# Patient Record
Sex: MALE | Race: WHITE
[De-identification: names, ages, dates, MRNs, and addresses within clinical notes are randomized per-mention and may not be internally consistent; named-entity substitution may affect disease eponyms.]

---

## 2020-09-29 ENCOUNTER — HOSPITAL ENCOUNTER (OUTPATIENT)
Dept: HOSPITAL 47 - RADXRYALE | Age: 35
Discharge: HOME | End: 2020-09-29
Attending: PHYSICIAN ASSISTANT
Payer: COMMERCIAL

## 2020-09-29 DIAGNOSIS — S97.82XA: ICD-10-CM

## 2020-09-29 DIAGNOSIS — M79.672: ICD-10-CM

## 2020-09-29 DIAGNOSIS — M47.814: Primary | ICD-10-CM

## 2020-09-29 PROCEDURE — 72072 X-RAY EXAM THORAC SPINE 3VWS: CPT

## 2020-09-30 NOTE — XR
EXAMINATION TYPE: XR thoracic spine complete

 

DATE OF EXAM: 9/29/2020

 

COMPARISON: NONE

 

HISTORY: Pain

 

TECHNIQUE: 3 views submitted

 

FINDINGS:

Alignment is anatomic.  There is no compression deformities. Hypertrophic and degenerative changes of
 the spine.  

 

IMPRESSION:

1. Multilevel hypertrophic and degenerative changes spine. Correlate with MRI as clinically warranted

## 2020-09-30 NOTE — XR
EXAMINATION TYPE: XR foot complete LT

 

DATE OF EXAM: 9/29/2020

 

COMPARISON: NONE

 

HISTORY: Pain

 

TECHNIQUE: Three views are submitted.

 

FINDINGS:

No erosive changes.. Hypertrophic arthropathy of the first MTP. Lucency overlying the distal phalanx 
the first digit.

 

IMPRESSION:

1. Findings suspicious for hairline fracture distal phalanx first digit correlate with point tenderne
ss..

## 2021-11-04 ENCOUNTER — HOSPITAL ENCOUNTER (OUTPATIENT)
Dept: HOSPITAL 47 - RADXRYALE | Age: 36
Discharge: HOME | End: 2021-11-04
Attending: FAMILY MEDICINE
Payer: COMMERCIAL

## 2021-11-04 DIAGNOSIS — M46.96: ICD-10-CM

## 2021-11-04 DIAGNOSIS — M43.17: Primary | ICD-10-CM

## 2021-11-04 PROCEDURE — 73502 X-RAY EXAM HIP UNI 2-3 VIEWS: CPT

## 2021-11-04 PROCEDURE — 72110 X-RAY EXAM L-2 SPINE 4/>VWS: CPT

## 2021-11-04 NOTE — XR
EXAMINATION TYPE: XR lumbosacral spine 5 views

 

DATE OF EXAM: 11/4/2021

 

Comparison: None

 

Clinical History: 35-year-old male  RT SCIATICA

 

Findings:

Facet arthropathy lower lumbar spine. There are bilateral L5 pars defects with grade 1, nearly grade 
2 anterolisthesis at L5-S1. Disc interspaces are maintained and vertebral body heights are preserved.


 

 

Impression:

L5 pars defects with grade 1, nearly grade 2 anterolisthesis at L5-S1. Some facet arthropathy in the 
lower lumbar spine.

## 2021-11-04 NOTE — XR
EXAMINATION TYPE: XR Hip Complete 2 views RT

 

DATE OF EXAM: 11/4/2021

 

Comparison: None

 

Clinical History: 35-year-old male  RT SCIATICA

 

Findings:

The right hip appears intact. No acute fracture, subluxation, or dislocation.

 

 

Impression:

No acute osseous abnormality seen.

## 2021-11-22 ENCOUNTER — HOSPITAL ENCOUNTER (OUTPATIENT)
Dept: HOSPITAL 47 - RADXRMAIN | Age: 36
Discharge: HOME | End: 2021-11-22
Attending: PHYSICAL MEDICINE & REHABILITATION
Payer: COMMERCIAL

## 2021-11-22 DIAGNOSIS — Z18.10: Primary | ICD-10-CM

## 2021-11-22 PROCEDURE — 70030 X-RAY EYE FOR FOREIGN BODY: CPT

## 2021-11-22 NOTE — XR
EXAMINATION TYPE: XR orbit detect foreign body

 

DATE OF EXAM: 11/22/2021

 

COMPARISON: NONE

 

HISTORY: Possible foreign body

 

TECHNIQUE: 3 views

 

FINDINGS: Orbital margins are intact. Maxilla is intact. There is no evidence of a fracture. There is
 no evidence of radiopaque foreign body.

 

IMPRESSION: No foreign body.

## 2023-08-03 ENCOUNTER — HOSPITAL ENCOUNTER (OUTPATIENT)
Dept: HOSPITAL 47 - RADXRYALE | Age: 38
Discharge: HOME | End: 2023-08-03
Attending: PHYSICIAN ASSISTANT
Payer: COMMERCIAL

## 2023-08-03 DIAGNOSIS — M25.571: Primary | ICD-10-CM

## 2025-03-14 ENCOUNTER — HOSPITAL ENCOUNTER (OUTPATIENT)
Dept: HOSPITAL 47 - RADXRYALE | Age: 40
Discharge: HOME | End: 2025-03-14
Attending: FAMILY MEDICINE
Payer: COMMERCIAL

## 2025-03-14 DIAGNOSIS — M17.12: Primary | ICD-10-CM

## 2025-03-14 NOTE — XR
EXAMINATION TYPE: XR knee complete LT

 

DATE OF EXAM: 3/14/2025 9:27 AM

 

COMPARISON: None

 

CLINICAL INDICATION: Male, 39 years old with history of [id number] LT KNEE PAIN; [facility], pain

 

TECHNIQUE: XR knee complete LT  3 views submitted.

 

FINDINGS: The patella and prepatellar soft tissues are unremarkable.  No evidence of any acute osseou
s pathology, soft tissue swelling, or joint effusion is noted. Tricompartmental osteophyte formation 
involving the femoral condyles, tibial plateau and patella. Mild joint space narrowing. A fabella is 
present. Larger osteophyte near the medial femoral condyle.

 

IMPRESSION: 

1. No acute osseous pathology.

2. Mild tricompartmental osteoarthritic changes.

 

[facility], Workstation: [id number], 3/14/2025 9:32 AM

## 2025-06-06 ENCOUNTER — HOSPITAL ENCOUNTER (OUTPATIENT)
Dept: HOSPITAL 47 - OR | Age: 40
Discharge: HOME | End: 2025-06-06
Attending: SURGERY
Payer: COMMERCIAL

## 2025-06-06 VITALS — SYSTOLIC BLOOD PRESSURE: 122 MMHG | HEART RATE: 89 BPM | DIASTOLIC BLOOD PRESSURE: 73 MMHG

## 2025-06-06 VITALS — TEMPERATURE: 97.4 F

## 2025-06-06 VITALS — RESPIRATION RATE: 16 BRPM

## 2025-06-06 DIAGNOSIS — G89.18: ICD-10-CM

## 2025-06-06 DIAGNOSIS — Z79.899: ICD-10-CM

## 2025-06-06 DIAGNOSIS — K42.0: Primary | ICD-10-CM

## 2025-06-06 PROCEDURE — 64488 TAP BLOCK BI INJECTION: CPT

## 2025-06-06 PROCEDURE — 49592 RPR AA HRN 1ST < 3 NCR/STRN: CPT

## 2025-06-06 RX ADMIN — BUPIVACAINE HYDROCHLORIDE ONE ML: 2.5 INJECTION, SOLUTION EPIDURAL; INFILTRATION; INTRACAUDAL; PERINEURAL at 13:08

## 2025-06-06 RX ADMIN — DEXAMETHASONE SODIUM PHOSPHATE ONE MG: 4 INJECTION, SOLUTION INTRAMUSCULAR; INTRAVENOUS at 10:55

## 2025-06-06 RX ADMIN — MIDAZOLAM PRN MG: 1 INJECTION INTRAMUSCULAR; INTRAVENOUS at 11:13

## 2025-06-06 RX ADMIN — ACETAMINOPHEN PRN MG: 500 TABLET ORAL at 10:33

## 2025-06-06 RX ADMIN — HEPARIN SODIUM PRN UNIT: 5000 INJECTION, SOLUTION INTRAVENOUS; SUBCUTANEOUS at 11:22

## 2025-06-06 RX ADMIN — CEFAZOLIN SODIUM PRN MLS: 10 INJECTION, POWDER, FOR SOLUTION INTRAVENOUS at 12:10

## 2025-06-06 RX ADMIN — POTASSIUM CHLORIDE ONE MLS: 14.9 INJECTION, SOLUTION INTRAVENOUS at 10:11

## 2025-06-06 RX ADMIN — POTASSIUM CHLORIDE ONE MLS: 14.9 INJECTION, SOLUTION INTRAVENOUS at 14:11

## 2025-06-06 RX ADMIN — FENTANYL CITRATE STA MCG: 50 INJECTION, SOLUTION INTRAMUSCULAR; INTRAVENOUS at 11:13

## 2025-06-06 RX ADMIN — ONDANSETRON ONE MG: 2 INJECTION INTRAMUSCULAR; INTRAVENOUS at 10:55

## 2025-06-06 RX ADMIN — BUPIVACAINE HYDROCHLORIDE ONE ML: 2.5 INJECTION, SOLUTION EPIDURAL; INFILTRATION; INTRACAUDAL; PERINEURAL at 12:35

## 2025-06-06 RX ADMIN — POTASSIUM CHLORIDE SCH MLS/HR: 14.9 INJECTION, SOLUTION INTRAVENOUS at 10:36

## 2025-06-06 RX ADMIN — HYDROMORPHONE HYDROCHLORIDE PRN MG: 1 INJECTION, SOLUTION INTRAMUSCULAR; INTRAVENOUS; SUBCUTANEOUS at 14:07
